# Patient Record
(demographics unavailable — no encounter records)

---

## 2024-10-07 NOTE — ASSESSMENT
[FreeTextEntry1] : After discussing various treatment options with the patient including but not limited to oral medications, physical therapy, exercise, modalities as well as interventional spinal injections, we have decided with the following plan:   1) Intervention Injection Therapy: I personally reviewed the MRI/CT scan images and agree with the radiologist's report. The radiological findings were discussed with the patient. The risks, benefits, contents and alternatives to injection were explained in full to the patient. Risks outlined include but are not limited to infection, sepsis, bleeding, post-dural puncture headache, nerve damage, temporary increase in pain, syncopal episode, failure to resolve symptoms, allergic reaction, symptom recurrence, and elevation of blood sugar in diabetics. Cortisone may cause immunosuppression. Patient understands the risks. All questions were answered. After discussion of options, patient requested an injection. Information regarding the injection was given to the patient. Which medications to stop prior to the injection was explained to the patient as well.   Follow up in 1-2 weeks post injection for re-evaluation. Continue Home exercises, stretching, activity modification, physical therapy, and conservative care.   Patient is presenting with acute/sub-acute radicular pain with impairment in ADLs and functionality.  The pain has not responded sufficiently to  conservative care including nsaid therapy and/or physical therapy.  There is no bleeding tendency, unstable medical condition, or systemic infection. The purpose of the spinal injections is to facilitate active therapy by providing short term relief through reduction of pain and inflammation.   Injections, by themselves, are not likely to provide long-term relief. Rather, active rehabilitation with modified work achieves long-term relief by increasing active ROM, strength and stability.   left L3/4 L4/5 TFESI

## 2024-10-07 NOTE — PHYSICAL EXAM
[NL (90)] : forward flexion 90 degrees [Extension] : extension [4___] : left hip flexion 4[unfilled]/5 [] : positive equivocal straight leg raise [de-identified] : extension 10 degrees

## 2024-10-07 NOTE — HISTORY OF PRESENT ILLNESS
[FreeTextEntry1] : 10/07/2024 : Patient presents for initial evaluation. She c/o of pain in the lower back that radiates to the back of the left leg to the knee. Had left greater troch injection without relief.  She had seen Dr. Pretty and given Mobic which did not help. PT did not help. pain also radiates to the groin. gets numbness in the left lateral thigh.    Subjective Weakness: No Numbness/Tingling: Yes Bladder/Bowel dysfunction: No Treatments Tried: Physical Therapy Blood Thinners:   Attempted modalities for current pain complaint: See above: Medications: No   Injections: No   Previous Spine Surgery: N/A   Imaging: MRI Lumbar Spine (5/19/23)11-T12: There is broad-based central protruded disc herniation flattening the ventral cord and resulting mild to moderate spinal canal stenosis. There is no compressive myelomalacia. Neural foramens are patent. T12-L1: There is left central protruded disc herniation impressing upon the ventral thecal sac. There is left central annular fissure. No significant spinal canal or foraminal stenosis. L1-2: There is central protruded disc herniation impressing upon the ventral thecal sac. No spinal canal or foraminal stenosis.  L2-3: There is disc bulge and superimposed left lateral recess disc herniation impinging the descending left L3 nerve root. There is mild spinal canal stenosis and mild left foraminal narrowing. Right neural foramen is patent. L3-4: There is mild disc bulge without significant spinal canal or foraminal stenosis. L4-5: There is disc bulge and left foraminal protruded disc herniation narrowing the inferior aspect of left neural foramen and contacting the exiting left L4 nerve root. Central spinal canal and right neural foramen are patent. L5-S1: There is central protruded disc herniation indenting the descending right S1 nerve roots. Central spinal canal and neural foramens are patent.   [Lower back] : lower back [9] : 9 [3] : 3 [Dull/Aching] : dull/aching [Throbbing] : throbbing [Tingling] : tingling [Constant] : constant [Sleep] : sleep [Meds] : meds [Heat] : heat [Sitting] : sitting [Standing] : standing [Walking] : walking [Lying in bed] : lying in bed [] : yes [FreeTextEntry6] : numbness [FreeTextEntry7] : lt leg, lt buttock, lt groin [FreeTextEntry9] : aleve

## 2024-11-05 NOTE — HISTORY OF PRESENT ILLNESS
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 sarmiento and had xrays The pain is located  ant and deep The pain is associated with  clicking Worse with activity and better at rest. Has tried activity mod, otc meds  11/5/24- R knee CSI (9/17/24) with impovement,  Left knee doing well,

## 2024-11-05 NOTE — ASSESSMENT
[FreeTextEntry1] :  xray from 9/4/24 -: there are no fractures, subluxations or dislocations. mild pf deg changes  - We discussed their diagnosis and treatment options at length including the risks and benefits of both surgical treatment with a knee replacement and non-surgical options. Surgical risks include but are not limited to pain, infection, bleeding, vascular injury, numbness, tingling, nerve damage. - Due to risks of surgery, they will continue conservative treatment with PT, icing, and anti-inflammatory medications - The patient was provided with a PT prescription to work on ROM, hip ER/abductors strengthening, quad/hamstring stretches and strengthening, and other exercises - The patient was advised to let pain guide the gradual advancement of activities. - Follow up as needed in 6 weeks to re-evaluate, if no improvement we spoke about possibility of viscosupplementation injections    Medication Discussion: 1) We discussed a comprehensive treatment plan that included possible pharmaceutical management involving the use of prescription strength medications including but not limited to options such as oral Naprosyn 500mg BID, once daily Meloxicam 15 mg, or 500-650 mg Tylenol versus over the counter oral medications in addition to discussing possible topical prescription Pennsaid vs  Voltaren gel. 2) There is a moderate risk of morbidity with further treatment, especially from use of prescription strength medications and possible side effects of these medications which include but are not limited to upset stomach with oral medications, skin reactions to topical medications and GI/cardiac/renal issues with long term use. 3) I recommended that the patient follow-up with their medical physician if there are any significant potential issues with long term medication use such as interactions with current medications or with exacerbation of underlying medical comorbidities. 4) The benefits and risks associated with use of oral and / or topical prescription and over the counter anti-inflammatory medications were discussed with the patient. The patient voiced understanding of the risks including but not limited to bleeding, stroke, kidney dysfunction, heart disease, and were referred to the black box warning label for further information.

## 2024-11-05 NOTE — IMAGING
[de-identified] :  RIGHT KNEE Inspection:  minimal effusion Palpation: medial facet of the patella tenderness  Knee Range of Motion:  0-135 Strength: 5/5 Quadriceps strength, 5/5 Hamstring strength, 4/5 Hip Abductor strength Neurological: light touch is intact throughout Ligament Stability and Special Tests:  McMurrays: neg Lachman: neg Pivot Shift: neg Posterior Drawer: neg Valgus: neg Varus: neg Patella Apprehension: neg Patella Maltracking: neg   LEFT KNEE Inspection:  minimal effusion Palpation: medial facet of the patella tenderness  Knee Range of Motion:  0-135 Strength: 5/5 Quadriceps strength, 5/5 Hamstring strength, 4/5 Hip Abductor strength Neurological: light touch is intact throughout Ligament Stability and Special Tests:  McMurrays: neg Lachman: neg Pivot Shift: neg Posterior Drawer: neg Valgus: neg Varus: neg Patella Apprehension: neg Patella Maltracking: neg

## 2024-12-23 NOTE — PHYSICAL EXAM
[NL (90)] : forward flexion 90 degrees [Extension] : extension [4___] : left hip flexion 4[unfilled]/5 [] : no ecchymosis [de-identified] : extension 10 degrees

## 2024-12-23 NOTE — HISTORY OF PRESENT ILLNESS
[Lower back] : lower back [9] : 9 [3] : 3 [Dull/Aching] : dull/aching [Throbbing] : throbbing [Tingling] : tingling [Constant] : constant [Sleep] : sleep [Meds] : meds [Heat] : heat [Sitting] : sitting [Standing] : standing [Walking] : walking [Lying in bed] : lying in bed [FreeTextEntry1] : 12/23/2024: follow up today   10/07/2024 : Patient presents for initial evaluation. She c/o of pain in the lower back that radiates to the back of the left leg to the knee. Had left greater troch injection without relief.  She had seen Dr. Pretty and given Mobic which did not help. PT did not help. pain also radiates to the groin. gets numbness in the left lateral thigh.    Subjective Weakness: No Numbness/Tingling: Yes Bladder/Bowel dysfunction: No Treatments Tried: Physical Therapy Blood Thinners:   Attempted modalities for current pain complaint: See above: Medications: No   Injections: No   Previous Spine Surgery: N/A   Imaging: MRI Lumbar Spine (5/19/23)11-T12: There is broad-based central protruded disc herniation flattening the ventral cord and resulting mild to moderate spinal canal stenosis. There is no compressive myelomalacia. Neural foramens are patent. T12-L1: There is left central protruded disc herniation impressing upon the ventral thecal sac. There is left central annular fissure. No significant spinal canal or foraminal stenosis. L1-2: There is central protruded disc herniation impressing upon the ventral thecal sac. No spinal canal or foraminal stenosis.  L2-3: There is disc bulge and superimposed left lateral recess disc herniation impinging the descending left L3 nerve root. There is mild spinal canal stenosis and mild left foraminal narrowing. Right neural foramen is patent. L3-4: There is mild disc bulge without significant spinal canal or foraminal stenosis. L4-5: There is disc bulge and left foraminal protruded disc herniation narrowing the inferior aspect of left neural foramen and contacting the exiting left L4 nerve root. Central spinal canal and right neural foramen are patent. L5-S1: There is central protruded disc herniation indenting the descending right S1 nerve roots. Central spinal canal and neural foramens are patent.   [] : no [FreeTextEntry6] : numbness [FreeTextEntry7] : lt leg, lt buttock, lt groin [FreeTextEntry9] : aleve

## 2025-01-28 NOTE — HISTORY OF PRESENT ILLNESS
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 sarmiento and had xrays The pain is located  ant and deep The pain is associated with  clicking Worse with activity and better at rest. Has tried activity mod, otc meds  11/5/24- R knee CSI (9/17/24) with impovement,  Left knee doing well,  1/28/25 - continue to have knee pain

## 2025-01-28 NOTE — ASSESSMENT
[FreeTextEntry1] : xray from 9/4/24 -: there are no fractures, subluxations or dislocations. mild pf deg changes  - We discussed their diagnosis and treatment options at length including the risks and benefits of both surgical treatment with a knee replacement and non-surgical options. Surgical risks include but are not limited to pain, infection, bleeding, vascular injury, numbness, tingling, nerve damage. - Due to risks of surgery, they will continue conservative treatment with PT, icing, and anti-inflammatory medications - The patient was provided with a PT prescription to work on ROM, hip ER/abductors strengthening, quad/hamstring stretches and strengthening, and other exercises - The patient was advised to let pain guide the gradual advancement of activities. - we spoke about possibility of viscosupplementation injections and they want to proceed - B/L knee Euflexxa #1 given, estefania well    Medication Discussion: 1) We discussed a comprehensive treatment plan that included possible pharmaceutical management involving the use of prescription strength medications including but not limited to options such as oral Naprosyn 500mg BID, once daily Meloxicam 15 mg, or 500-650 mg Tylenol versus over the counter oral medications in addition to discussing possible topical prescription Pennsaid vs  Voltaren gel. 2) There is a moderate risk of morbidity with further treatment, especially from use of prescription strength medications and possible side effects of these medications which include but are not limited to upset stomach with oral medications, skin reactions to topical medications and GI/cardiac/renal issues with long term use. 3) I recommended that the patient follow-up with their medical physician if there are any significant potential issues with long term medication use such as interactions with current medications or with exacerbation of underlying medical comorbidities. 4) The benefits and risks associated with use of oral and / or topical prescription and over the counter anti-inflammatory medications were discussed with the patient. The patient voiced understanding of the risks including but not limited to bleeding, stroke, kidney dysfunction, heart disease, and were referred to the black box warning label for further information.

## 2025-01-28 NOTE — IMAGING
[de-identified] :  RIGHT KNEE Inspection:  minimal effusion Palpation: medial facet of the patella tenderness  Knee Range of Motion:  0-135 Strength: 5/5 Quadriceps strength, 5/5 Hamstring strength, 4/5 Hip Abductor strength Neurological: light touch is intact throughout Ligament Stability and Special Tests:  McMurrays: neg Lachman: neg Pivot Shift: neg Posterior Drawer: neg Valgus: neg Varus: neg Patella Apprehension: neg Patella Maltracking: neg   LEFT KNEE Inspection:  minimal effusion Palpation: medial facet of the patella tenderness  Knee Range of Motion:  0-135 Strength: 5/5 Quadriceps strength, 5/5 Hamstring strength, 4/5 Hip Abductor strength Neurological: light touch is intact throughout Ligament Stability and Special Tests:  McMurrays: neg Lachman: neg Pivot Shift: neg Posterior Drawer: neg Valgus: neg Varus: neg Patella Apprehension: neg Patella Maltracking: neg

## 2025-02-25 NOTE — IMAGING
[de-identified] :  RIGHT KNEE Inspection:  minimal effusion Palpation: medial facet of the patella tenderness  Knee Range of Motion:  0-135 Strength: 5/5 Quadriceps strength, 5/5 Hamstring strength, 4/5 Hip Abductor strength Neurological: light touch is intact throughout Ligament Stability and Special Tests:  McMurrays: neg Lachman: neg Pivot Shift: neg Posterior Drawer: neg Valgus: neg Varus: neg Patella Apprehension: neg Patella Maltracking: neg   LEFT KNEE Inspection:  minimal effusion Palpation: medial facet of the patella tenderness  Knee Range of Motion:  0-135 Strength: 5/5 Quadriceps strength, 5/5 Hamstring strength, 4/5 Hip Abductor strength Neurological: light touch is intact throughout Ligament Stability and Special Tests:  McMurrays: neg Lachman: neg Pivot Shift: neg Posterior Drawer: neg Valgus: neg Varus: neg Patella Apprehension: neg Patella Maltracking: neg

## 2025-02-25 NOTE — HISTORY OF PRESENT ILLNESS
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 sarmiento and had xrays The pain is located  ant and deep The pain is associated with  clicking Worse with activity and better at rest. Has tried activity mod, otc meds  11/5/24- R knee CSI (9/17/24) with impovement,  Left knee doing well,  2/25/25 - continue to have knee pain

## 2025-02-25 NOTE — ASSESSMENT
[FreeTextEntry1] : xray from 9/4/24 -: there are no fractures, subluxations or dislocations. mild pf deg changes  - We discussed their diagnosis and treatment options at length including the risks and benefits of both surgical treatment with a knee replacement and non-surgical options. Surgical risks include but are not limited to pain, infection, bleeding, vascular injury, numbness, tingling, nerve damage. - Due to risks of surgery, they will continue conservative treatment with PT, icing, and anti-inflammatory medications - The patient was provided with a PT prescription to work on ROM, hip ER/abductors strengthening, quad/hamstring stretches and strengthening, and other exercises - The patient was advised to let pain guide the gradual advancement of activities. - we spoke about possibility of viscosupplementation injections and they want to proceed - R knee Euflexxa #1 given, estefania well    Medication Discussion: 1) We discussed a comprehensive treatment plan that included possible pharmaceutical management involving the use of prescription strength medications including but not limited to options such as oral Naprosyn 500mg BID, once daily Meloxicam 15 mg, or 500-650 mg Tylenol versus over the counter oral medications in addition to discussing possible topical prescription Pennsaid vs  Voltaren gel. 2) There is a moderate risk of morbidity with further treatment, especially from use of prescription strength medications and possible side effects of these medications which include but are not limited to upset stomach with oral medications, skin reactions to topical medications and GI/cardiac/renal issues with long term use. 3) I recommended that the patient follow-up with their medical physician if there are any significant potential issues with long term medication use such as interactions with current medications or with exacerbation of underlying medical comorbidities. 4) The benefits and risks associated with use of oral and / or topical prescription and over the counter anti-inflammatory medications were discussed with the patient. The patient voiced understanding of the risks including but not limited to bleeding, stroke, kidney dysfunction, heart disease, and were referred to the black box warning label for further information.

## 2025-02-25 NOTE — IMAGING
[de-identified] :  RIGHT KNEE Inspection:  minimal effusion Palpation: medial facet of the patella tenderness  Knee Range of Motion:  0-135 Strength: 5/5 Quadriceps strength, 5/5 Hamstring strength, 4/5 Hip Abductor strength Neurological: light touch is intact throughout Ligament Stability and Special Tests:  McMurrays: neg Lachman: neg Pivot Shift: neg Posterior Drawer: neg Valgus: neg Varus: neg Patella Apprehension: neg Patella Maltracking: neg   LEFT KNEE Inspection:  minimal effusion Palpation: medial facet of the patella tenderness  Knee Range of Motion:  0-135 Strength: 5/5 Quadriceps strength, 5/5 Hamstring strength, 4/5 Hip Abductor strength Neurological: light touch is intact throughout Ligament Stability and Special Tests:  McMurrays: neg Lachman: neg Pivot Shift: neg Posterior Drawer: neg Valgus: neg Varus: neg Patella Apprehension: neg Patella Maltracking: neg

## 2025-02-25 NOTE — HISTORY OF PRESENT ILLNESS
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 sarmiento and had xrays The pain is located  ant and deep The pain is associated with  clicking Worse with activity and better at rest. Has tried activity mod, otc meds  11/5/24- R knee CSI (9/17/24) with impovement,  Left knee doing well,  2/25/25 - continue to have knee pain   01-Mar-2022 16:00

## 2025-02-25 NOTE — PROCEDURE
[FreeTextEntry3] :  Injection Procedure Note:  The risks, benefits, and alternatives to viscosupplementation injection were reviewed with the patient. Risks outlined include but are not limited to infection, sepsis, bleeding, scarring, temporary increase in pain, syncopal episode, failure to resolve symptoms, symptoms recurrence, allergic reaction, flare reaction, pseudoseptic reaction.  Patient understood the risks and asked to proceed with this treatment course.  Patient Identification Name/: Verbal with patient and/or family  Procedure Verification: Procedure confirmed with patient or family/designee Consent for procedure: Verbal Consent Given Relevant documentation completed, reviewed, and signed Clinical indications for procedure confirmed  Time-out with all members of procedure team immediately prior to procedure: Correct patient identified. Agreement on procedure. Correct side and site.   KNEE INJECTION ([facility]) - RIGHT After verbal consent and identification of the correct patient and correct site, the right knee were prepped using alcohol swabs and betadine. This was allowed time to air dry.  An injection of EUFLEXXA 2ml  #1 was injected into the knee using a sterile 22G needle after ethyl chloride spray for skin anesthesia.  The patient tolerated the procedure well. After-care instructions were provided and included instructions to ice the area and to call if redness, pain, or fever develop.

## 2025-03-04 NOTE — IMAGING
[de-identified] :  RIGHT KNEE Inspection:  minimal effusion Palpation: medial facet of the patella tenderness  Knee Range of Motion:  0-135 Strength: 5/5 Quadriceps strength, 5/5 Hamstring strength, 4/5 Hip Abductor strength Neurological: light touch is intact throughout Ligament Stability and Special Tests:  McMurrays: neg Lachman: neg Pivot Shift: neg Posterior Drawer: neg Valgus: neg Varus: neg Patella Apprehension: neg Patella Maltracking: neg   LEFT KNEE Inspection:  minimal effusion Palpation: medial facet of the patella tenderness  Knee Range of Motion:  0-135 Strength: 5/5 Quadriceps strength, 5/5 Hamstring strength, 4/5 Hip Abductor strength Neurological: light touch is intact throughout Ligament Stability and Special Tests:  McMurrays: neg Lachman: neg Pivot Shift: neg Posterior Drawer: neg Valgus: neg Varus: neg Patella Apprehension: neg Patella Maltracking: neg

## 2025-03-04 NOTE — HISTORY OF PRESENT ILLNESS
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 sarmiento and had xrays The pain is located  ant and deep The pain is associated with  clicking Worse with activity and better at rest. Has tried activity mod, otc meds  11/5/24- R knee CSI (9/17/24) with impovement,  Left knee doing well,  2/25/25 - continue to have knee pain 3/4/25 - here for R knee Euflexxa #2

## 2025-03-04 NOTE — ASSESSMENT
[FreeTextEntry1] : xray from 9/4/24 -: there are no fractures, subluxations or dislocations. mild pf deg changes  - We discussed their diagnosis and treatment options at length including the risks and benefits of both surgical treatment with a knee replacement and non-surgical options. Surgical risks include but are not limited to pain, infection, bleeding, vascular injury, numbness, tingling, nerve damage. - Due to risks of surgery, they will continue conservative treatment with PT, icing, and anti-inflammatory medications - The patient was provided with a PT prescription to work on ROM, hip ER/abductors strengthening, quad/hamstring stretches and strengthening, and other exercises - The patient was advised to let pain guide the gradual advancement of activities. - we spoke about possibility of viscosupplementation injections and they want to proceed - R knee Euflexxa #2 given, estefania well

## 2025-03-04 NOTE — HISTORY OF PRESENT ILLNESS
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 sarmiento and had xrays The pain is located  ant and deep The pain is associated with  clicking Worse with activity and better at rest. Has tried activity mod, otc meds  11/5/24- R knee CSI (9/17/24) with impovement,  Left knee doing well,  2/25/25 - continue to have knee pain 3/4/25 - here for R knee Euflexxa #2

## 2025-03-10 NOTE — IMAGING
[de-identified] :  RIGHT KNEE Inspection:  minimal effusion Palpation: medial facet of the patella tenderness  Knee Range of Motion:  0-135 Strength: 5/5 Quadriceps strength, 5/5 Hamstring strength, 4/5 Hip Abductor strength Neurological: light touch is intact throughout Ligament Stability and Special Tests:  McMurrays: neg Lachman: neg Pivot Shift: neg Posterior Drawer: neg Valgus: neg Varus: neg Patella Apprehension: neg Patella Maltracking: neg   LEFT KNEE Inspection:  minimal effusion Palpation: medial facet of the patella tenderness  Knee Range of Motion:  0-135 Strength: 5/5 Quadriceps strength, 5/5 Hamstring strength, 4/5 Hip Abductor strength Neurological: light touch is intact throughout Ligament Stability and Special Tests:  McMurrays: neg Lachman: neg Pivot Shift: neg Posterior Drawer: neg Valgus: neg Varus: neg Patella Apprehension: neg Patella Maltracking: neg

## 2025-03-10 NOTE — HISTORY OF PRESENT ILLNESS
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 sarmiento and had xrays The pain is located  ant and deep The pain is associated with  clicking Worse with activity and better at rest. Has tried activity mod, otc meds  11/5/24- R knee CSI (9/17/24) with impovement,  Left knee doing well,  2/25/25 - continue to have knee pain 3/4/25 - here for R knee Euflexxa #2 3/11/25 - here for R knee Euflexxa #3

## 2025-03-10 NOTE — ASSESSMENT
[FreeTextEntry1] : xray from 9/4/24 -: there are no fractures, subluxations or dislocations. mild pf deg changes  - We discussed their diagnosis and treatment options at length including the risks and benefits of both surgical treatment with a knee replacement and non-surgical options. Surgical risks include but are not limited to pain, infection, bleeding, vascular injury, numbness, tingling, nerve damage. - Due to risks of surgery, they will continue conservative treatment with PT, icing, and anti-inflammatory medications - The patient was provided with a PT prescription to work on ROM, hip ER/abductors strengthening, quad/hamstring stretches and strengthening, and other exercises - The patient was advised to let pain guide the gradual advancement of activities. - we spoke about possibility of viscosupplementation injections and they want to proceed - R knee Euflexxa #3 given, estefania well

## 2025-03-11 NOTE — HISTORY OF PRESENT ILLNESS
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 sarmiento and had xrays The pain is located  ant and deep The pain is associated with  clicking Worse with activity and better at rest. Has tried activity mod, otc meds  11/5/24- R knee CSI (9/17/24) with impovement,  Left knee doing well,  2/25/25 - continue to have knee pain 3/4/25 - here for R knee Euflexxa #2 3/11/25 - here for R knee Euflexxa #3

## 2025-03-11 NOTE — IMAGING
[de-identified] :  RIGHT KNEE Inspection:  minimal effusion Palpation: medial facet of the patella tenderness  Knee Range of Motion:  0-135 Strength: 5/5 Quadriceps strength, 5/5 Hamstring strength, 4/5 Hip Abductor strength Neurological: light touch is intact throughout Ligament Stability and Special Tests:  McMurrays: neg Lachman: neg Pivot Shift: neg Posterior Drawer: neg Valgus: neg Varus: neg Patella Apprehension: neg Patella Maltracking: neg   LEFT KNEE Inspection:  minimal effusion Palpation: medial facet of the patella tenderness  Knee Range of Motion:  0-135 Strength: 5/5 Quadriceps strength, 5/5 Hamstring strength, 4/5 Hip Abductor strength Neurological: light touch is intact throughout Ligament Stability and Special Tests:  McMurrays: neg Lachman: neg Pivot Shift: neg Posterior Drawer: neg Valgus: neg Varus: neg Patella Apprehension: neg Patella Maltracking: neg

## 2025-03-18 NOTE — PHYSICAL EXAM
[NL (90)] : forward flexion 90 degrees [Extension] : extension [4___] : left hip flexion 4[unfilled]/5 [Rotation to right] : rotation to right [de-identified] : left lateral rotation 75 degrees [[id number]] : right lateral rotation 45 degrees [] : no ecchymosis [de-identified] : extension 10 degrees

## 2025-03-18 NOTE — HISTORY OF PRESENT ILLNESS
[Lower back] : lower back [9] : 9 [3] : 3 [Dull/Aching] : dull/aching [Throbbing] : throbbing [Tingling] : tingling [Sleep] : sleep [Meds] : meds [Heat] : heat [Sitting] : sitting [Standing] : standing [Walking] : walking [Lying in bed] : lying in bed CHEST PAIN/PALPITATIONS [Intermittent] : intermittent [FreeTextEntry1] : 03/18/2025: follow up today. Since last visit, her son had knee surgery. Had a right knee injection with Dr. Munoz.  Last seen in October without follow up. Had MRI left hip: 3/12/25: OC: No evidence of fracture in the left hip right hip or throughout the bony pelvis.  Mild distal left gluteus medius tendinopathy without tear.  Mild to moderate left and mild right proximal hamstring tendinopathy without tear. She reports pain in the right neck and down the right arm. feels like a "buzzing sensation."  xray: DDD at c4/5 and c5/6 and loss of normal kyphosis.   10/07/2024 : Patient presents for initial evaluation. She c/o of pain in the lower back that radiates to the back of the left leg to the knee. Had left greater troch injection without relief.  She had seen Dr. Pretty and given Mobic which did not help. PT did not help. pain also radiates to the groin. gets numbness in the left lateral thigh.    Subjective Weakness: No Numbness/Tingling: Yes Bladder/Bowel dysfunction: No Treatments Tried: Physical Therapy Blood Thinners:   Attempted modalities for current pain complaint: See above: Medications: No   Injections: No   Previous Spine Surgery: N/A   Imaging: MRI Lumbar Spine (5/19/23)11-T12: There is broad-based central protruded disc herniation flattening the ventral cord and resulting mild to moderate spinal canal stenosis. There is no compressive myelomalacia. Neural foramens are patent. T12-L1: There is left central protruded disc herniation impressing upon the ventral thecal sac. There is left central annular fissure. No significant spinal canal or foraminal stenosis. L1-2: There is central protruded disc herniation impressing upon the ventral thecal sac. No spinal canal or foraminal stenosis.  L2-3: There is disc bulge and superimposed left lateral recess disc herniation impinging the descending left L3 nerve root. There is mild spinal canal stenosis and mild left foraminal narrowing. Right neural foramen is patent. L3-4: There is mild disc bulge without significant spinal canal or foraminal stenosis. L4-5: There is disc bulge and left foraminal protruded disc herniation narrowing the inferior aspect of left neural foramen and contacting the exiting left L4 nerve root. Central spinal canal and right neural foramen are patent. L5-S1: There is central protruded disc herniation indenting the descending right S1 nerve roots. Central spinal canal and neural foramens are patent.   [] : no [FreeTextEntry6] : numbness [FreeTextEntry7] : lt leg, lt buttock, lt groin [FreeTextEntry9] : aleve

## 2025-03-18 NOTE — ASSESSMENT
[FreeTextEntry1] : After discussing various treatment options with the patient including but not limited to oral medications, physical therapy, exercise, modalities as well as interventional spinal injections, we have decided with the following plan:   1) Intervention Injection Therapy: I personally reviewed the MRI/CT scan images and agree with the radiologist's report. The radiological findings were discussed with the patient. The risks, benefits, contents and alternatives to injection were explained in full to the patient. Risks outlined include but are not limited to infection, sepsis, bleeding, post-dural puncture headache, nerve damage, temporary increase in pain, syncopal episode, failure to resolve symptoms, allergic reaction, symptom recurrence, and elevation of blood sugar in diabetics. Cortisone may cause immunosuppression. Patient understands the risks. All questions were answered. After discussion of options, patient requested an injection. Information regarding the injection was given to the patient. Which medications to stop prior to the injection was explained to the patient as well.   Follow up in 1-2 weeks post injection for re-evaluation. Continue Home exercises, stretching, activity modification, physical therapy, and conservative care.   Patient is presenting with acute/sub-acute radicular pain with impairment in ADLs and functionality.  The pain has not responded sufficiently to  conservative care including nsaid therapy and/or physical therapy.  There is no bleeding tendency, unstable medical condition, or systemic infection. The purpose of the spinal injections is to facilitate active therapy by providing short term relief through reduction of pain and inflammation.   Injections, by themselves, are not likely to provide long-term relief. Rather, active rehabilitation with modified work achieves long-term relief by increasing active ROM, strength and stability.   left L3/4 L4/5 TFESI   2) A MRI is indicated as there has been failure of numerous conservative therapies over the last 6-8 weeks. these include but are not limited to medication therapy and physical therapy. It is recognized that repeat imaging studies and other tests may be warranted by the clinical course and/or to follow the progress of treatment in some cases. It may be of value to repeat diagnostic procedures (ie imaging studies) during the course of care to reassess or stage the pathology when there is progression of symptoms or findings, prior to surgical interventions and/or therapeutic injections when clinically indicated.   MRI cervical spine   3) I would recommend a trial of neuropathic medication as patient presents with signs of nerve irritation. (ie burning, paresthesias etc) Goals of therapy would be to improve pain and overall QOL. Side effects reviewed with patient. Patient will call or stop medication if given side effects occur. duloxetine

## 2025-04-14 NOTE — PHYSICAL EXAM
[Rotation to right] : rotation to right [NL (90)] : forward flexion 90 degrees [Extension] : extension [4___] : left hip flexion 4[unfilled]/5 [de-identified] : left lateral rotation 75 degrees [TWNoteComboBox6] : right lateral rotation 45 degrees [] : no ecchymosis [de-identified] : extension 10 degrees

## 2025-04-14 NOTE — HISTORY OF PRESENT ILLNESS
[Lower back] : lower back [9] : 9 [3] : 3 [Dull/Aching] : dull/aching [Throbbing] : throbbing [Tingling] : tingling [Intermittent] : intermittent [Sleep] : sleep [Meds] : meds [Heat] : heat [Sitting] : sitting [Standing] : standing [Walking] : walking [Lying in bed] : lying in bed [FreeTextEntry1] : 04/14/2025: MRI results3/26/25: OC: multi level DDD. c4/5 disc bulge, herniation, cord impingement, severe b/l NF narrowing with b/l c5 nerve compression. c5/6: bulging, herniation, central stenosis, right great than left exiting c6 nerve root impingement, with right worse than left NF narrowing.  c6/7: posterior disc herniation, central stenosis with impingement of the exiting c7 nerve roots.  Duloxetine made her feel "off." so she stopped taking it.   03/18/2025: follow up today. Since last visit, her son had knee surgery. Had a right knee injection with Dr. Munoz.  Last seen in October without follow up. Had MRI left hip: 3/12/25: OC: No evidence of fracture in the left hip right hip or throughout the bony pelvis.  Mild distal left gluteus medius tendinopathy without tear.  Mild to moderate left and mild right proximal hamstring tendinopathy without tear. She reports pain in the right neck and down the right arm. feels like a "buzzing sensation."  xray: DDD at c4/5 and c5/6 and loss of normal kyphosis.   10/07/2024 : Patient presents for initial evaluation. She c/o of pain in the lower back that radiates to the back of the left leg to the knee. Had left greater troch injection without relief.  She had seen Dr. Pretty and given Mobic which did not help. PT did not help. pain also radiates to the groin. gets numbness in the left lateral thigh.    Subjective Weakness: No Numbness/Tingling: Yes Bladder/Bowel dysfunction: No Treatments Tried: Physical Therapy Blood Thinners:   Attempted modalities for current pain complaint: See above: Medications: No   Injections: No   Previous Spine Surgery: N/A   Imaging: MRI Lumbar Spine (5/19/23)11-T12: There is broad-based central protruded disc herniation flattening the ventral cord and resulting mild to moderate spinal canal stenosis. There is no compressive myelomalacia. Neural foramens are patent. T12-L1: There is left central protruded disc herniation impressing upon the ventral thecal sac. There is left central annular fissure. No significant spinal canal or foraminal stenosis. L1-2: There is central protruded disc herniation impressing upon the ventral thecal sac. No spinal canal or foraminal stenosis.  L2-3: There is disc bulge and superimposed left lateral recess disc herniation impinging the descending left L3 nerve root. There is mild spinal canal stenosis and mild left foraminal narrowing. Right neural foramen is patent. L3-4: There is mild disc bulge without significant spinal canal or foraminal stenosis. L4-5: There is disc bulge and left foraminal protruded disc herniation narrowing the inferior aspect of left neural foramen and contacting the exiting left L4 nerve root. Central spinal canal and right neural foramen are patent. L5-S1: There is central protruded disc herniation indenting the descending right S1 nerve roots. Central spinal canal and neural foramens are patent.   [] : no [FreeTextEntry6] : numbness [FreeTextEntry7] : lt leg, lt buttock, lt groin [FreeTextEntry9] : aleve

## 2025-04-14 NOTE — PHYSICAL EXAM
[Rotation to right] : rotation to right [NL (90)] : forward flexion 90 degrees [Extension] : extension [4___] : left hip flexion 4[unfilled]/5 [de-identified] : left lateral rotation 75 degrees [TWNoteComboBox6] : right lateral rotation 45 degrees [] : no ecchymosis [de-identified] : extension 10 degrees

## 2025-04-14 NOTE — ASSESSMENT
[FreeTextEntry1] : After discussing various treatment options with the patient including but not limited to oral medications, physical therapy, exercise, modalities as well as interventional spinal injections, we have decided with the following plan:   1) Intervention Injection Therapy: I personally reviewed the MRI/CT scan images and agree with the radiologist's report. The radiological findings were discussed with the patient. The risks, benefits, contents and alternatives to injection were explained in full to the patient. Risks outlined include but are not limited to infection, sepsis, bleeding, post-dural puncture headache, nerve damage, temporary increase in pain, syncopal episode, failure to resolve symptoms, allergic reaction, symptom recurrence, and elevation of blood sugar in diabetics. Cortisone may cause immunosuppression. Patient understands the risks. All questions were answered. After discussion of options, patient requested an injection. Information regarding the injection was given to the patient. Which medications to stop prior to the injection was explained to the patient as well.   Follow up in 1-2 weeks post injection for re-evaluation. Continue Home exercises, stretching, activity modification, physical therapy, and conservative care.   Patient is presenting with acute/sub-acute radicular pain with impairment in ADLs and functionality.  The pain has not responded sufficiently to  conservative care including nsaid therapy and/or physical therapy.  There is no bleeding tendency, unstable medical condition, or systemic infection. The purpose of the spinal injections is to facilitate active therapy by providing short term relief through reduction of pain and inflammation.   Injections, by themselves, are not likely to provide long-term relief. Rather, active rehabilitation with modified work achieves long-term relief by increasing active ROM, strength and stability.   C7-T1 Cervical Epidural Steroid Injection under fluoroscopic guidance with image. Of note, the C7-T1 level has been determined to be the safest level to inject in the cervical spine as the epidural space is the largest. Despite pathology at different levels, studies have shown that the medication will spread up to the most cranial level, despite the level of injection.    2) I would recommend a trial of neuropathic medication as patient presents with signs of nerve irritation. (ie burning, paresthesias etc) Goals of therapy would be to improve pain and overall QOL. Side effects reviewed with patient. Patient will call or stop medication if given side effects occur. Lyrica